# Patient Record
Sex: MALE | Race: BLACK OR AFRICAN AMERICAN | Employment: FULL TIME | URBAN - METROPOLITAN AREA
[De-identification: names, ages, dates, MRNs, and addresses within clinical notes are randomized per-mention and may not be internally consistent; named-entity substitution may affect disease eponyms.]

---

## 2023-01-30 ENCOUNTER — HOSPITAL ENCOUNTER (EMERGENCY)
Age: 38
Discharge: HOME | End: 2023-01-30
Attending: STUDENT IN AN ORGANIZED HEALTH CARE EDUCATION/TRAINING PROGRAM
Payer: MEDICAID

## 2023-01-30 VITALS
TEMPERATURE: 98.3 F | HEART RATE: 82 BPM | OXYGEN SATURATION: 97 % | DIASTOLIC BLOOD PRESSURE: 98 MMHG | SYSTOLIC BLOOD PRESSURE: 138 MMHG | RESPIRATION RATE: 16 BRPM

## 2023-01-30 DIAGNOSIS — M79.89 SOFT TISSUE MASS: Primary | ICD-10-CM

## 2023-01-30 PROCEDURE — 99283 EMERGENCY DEPT VISIT LOW MDM: CPT

## 2023-01-30 RX ORDER — CEPHALEXIN 500 MG/1
500 CAPSULE ORAL 4 TIMES DAILY
Qty: 28 CAPSULE | Refills: 0 | Status: SHIPPED | OUTPATIENT
Start: 2023-01-30 | End: 2023-02-06

## 2023-01-30 NOTE — Clinical Note
Kami Fuller was seen and treated in our emergency department on 1/30/2023. Please excuse him from work tomorrow, January 31.           Alethea Pineda

## 2023-01-31 NOTE — ED PROVIDER NOTES
[facility] EMERGENCY DEPT  EMERGENCY DEPARTMENT ENCOUNTER       Pt Name: Tao Rollins  MRN: [id number]  [place] 1985  Date of evaluation: 1/30/2023  Provider: DIPAK Lynn   PCP: Tomás Knowles NP  Note Started: 12:29 AM 1/31/23     ED attending involment: I have seen and evaluated the patient. My supervision physician was available for consultation. CHIEF COMPLAINT       Chief Complaint   Patient presents with    Cyst     Patient with cyst to base of L posterior scalp x 6 months. Has gotten bigger and more painful. HISTORY OF PRESENT ILLNESS: 1 or more elements      History From: Patient  HPI Limitations : None     Toa Rollins is a 40 y.o. male who presents area of swelling to the left posterior lower scalp for 6 months. The area has gotten slightly larger since he first noticed it. It causes pain to the area that is mild in severity and worse with palpation. He denies fever, drainage. Nursing Notes were all reviewed and agreed with or any disagreements were addressed in the HPI. REVIEW OF SYSTEMS      Review of Systems     Positives and Pertinent negatives as per HPI. PAST HISTORY     Past Medical History:  No past medical history on file. Past Surgical History:  No past surgical history on file. Family History:  No family history on file. Social History: Allergies:  Not on File    CURRENT MEDICATIONS      Discharge Medication List as of 1/30/2023  9:35 PM          PHYSICAL EXAM      ED Triage Vitals [01/30/23 1849]   ED Encounter Vitals Group      BP (!) 140/104      Pulse (Heart Rate) 86      Resp Rate 17      Temp 98.1 °F (36.7 °C)      Temp src       O2 Sat (%) 99 %      Weight       Height         Physical Exam  Vitals and nursing note reviewed. Constitutional:       General: He is not in acute distress. Skin:     Comments: There is a soft palpable mass to the left lower occipital region at the base of the scalp.  No significant erythema or warmth. No palpable cyst or lymphadenopathy. Neurological:      Mental Status: He is alert. DIAGNOSTIC RESULTS   LABS:     No results found for this or any previous visit (from the past 12 hour(s)). RADIOLOGY:  Non-plain film images such as CT, Ultrasound and MRI are read by the radiologist. Plain radiographic images are visualized and preliminarily interpreted by the ED Provider with the below findings:     N/A     Interpretation per the Radiologist below, if available at the time of this note:     No results found. PROCEDURES   Unless otherwise noted below, none  Procedures     EMERGENCY DEPARTMENT COURSE and DIFFERENTIAL DIAGNOSIS/MDM   Vitals:    Vitals:    01/30/23 1849 01/30/23 2208 01/30/23 2208   BP: (!) 140/104 (!) 138/98    Pulse: 86 82    Resp: 17 16    Temp: 98.1 °F (36.7 °C) 98.3 °F (36.8 °C)    SpO2: 99% 97% 97%        Patient was given the following medications:  Medications - No data to display    CONSULTS: (Who and What was discussed)  None    Chronic Conditions: None    Social Determinants affecting Dx or Tx: None    Records Reviewed (source and summary): None    MDM (CC/HPI Summary, DDx, ED Course, Reassessment, Disposition Considerations -Tests not done, Shared Decision Making, Pt Expectation of Test or Tx.): This is a 76-year-old male who presents with a localized area of soft tissue swelling and tenderness at the base of the left posterior scalp that is been present for 6 months and become more painful. Differential diagnosis includes lipoma, abscess, cyst, lymphadenopathy. He is afebrile and clinically well-appearing. There is a palpable soft tissue mass to the left posterior lower scalp that is approximately 4 cm in diameter. No significant erythema or warmth to the area. It does feel most consistent with a lipoma. However, will cover with antibiotic for possible overlying cellulitis given increased pain.   Will give general surgery referral for further evaluation and management. Discussed return precautions. FINAL IMPRESSION     1. Soft tissue mass          DISPOSITION/PLAN   Discharged        Care plan outlined and precautions discussed. Patient has no new complaints, changes, or physical findings. All medications were reviewed with the patient; will d/c home with antibiotic. All of pt's questions and concerns were addressed. Patient was instructed and agrees to follow up with general surgery, as well as to return to the ED upon further deterioration. Patient is ready to go home. PATIENT REFERRED TO:  Follow-up Information       Follow up With Specialties Details Why Contact Jorje Orozco MD General Surgery Call  to schedule a follow up [address] [place] [address] [address]  [phone]                DISCHARGE MEDICATIONS:  Discharge Medication List as of 1/30/2023  9:35 PM            DISCONTINUED MEDICATIONS:  Discharge Medication List as of 1/30/2023  9:35 PM          I am the Primary Clinician of Record. DIPAK Klein (electronically signed)    (Please note that parts of this dictation were completed with voice recognition software. Quite often unanticipated grammatical, syntax, homophones, and other interpretive errors are inadvertently transcribed by the computer software. Please disregards these errors.  Please excuse any errors that have escaped final proofreading.)

## 2023-02-13 ENCOUNTER — OFFICE VISIT (OUTPATIENT)
Dept: SURGERY | Age: 38
End: 2023-02-13
Payer: MEDICAID

## 2023-02-13 VITALS
WEIGHT: 199.8 LBS | TEMPERATURE: 97.5 F | OXYGEN SATURATION: 96 % | HEIGHT: 68 IN | DIASTOLIC BLOOD PRESSURE: 68 MMHG | BODY MASS INDEX: 30.28 KG/M2 | RESPIRATION RATE: 16 BRPM | HEART RATE: 86 BPM | SYSTOLIC BLOOD PRESSURE: 108 MMHG

## 2023-02-13 DIAGNOSIS — M79.89 SOFT TISSUE MASS: Primary | ICD-10-CM

## 2023-02-13 PROCEDURE — 99204 OFFICE O/P NEW MOD 45 MIN: CPT | Performed by: SURGERY

## 2023-02-13 NOTE — PROGRESS NOTES
Identified pt with two pt identifiers(name and ). Reviewed record in preparation for visit and have obtained necessary documentation. All patient medications has been reviewed. Chief Complaint   Patient presents with    Cyst     Seen at the request of Nikky Pike DO for the evaluation of cyst on the back of the head. Health Maintenance Due   Topic    Hepatitis C Screening     Depression Screen     COVID-19 Vaccine (1)    Flu Vaccine (1)       Vitals:    23 1411   BP: 108/68   Pulse: 86   Resp: 16   Temp: 97.5 °F (36.4 °C)   TempSrc: Oral   SpO2: 96%   Weight: 199 lb 12.8 oz (90.6 kg)   Height: 5' 8\" (1.727 m)   PainSc:   3   PainLoc: Neck       4. Have you been to the ER, urgent care clinic since your last visit? Hospitalized since your last visit? No    5. Have you seen or consulted any other health care providers outside of the [address] [place] [address] since your last visit? Include any pap smears or colon screening. No      Patient is accompanied by self I have received verbal consent from Kevin Hanson to discuss any/all medical information while they are present in the room.

## 2023-02-13 NOTE — H&P (VIEW-ONLY)
HISTORY OF PRESENT ILLNESS  Chadwick Todd is a 40 y.o. male. Posterior scalp mass. Present for 6 months. Getting bigger. Tried a round of antibiotics. Helped a little. No prior drainage          ____________________________________________________________________________  Patient presents with:  Cyst: Seen at the request of Poncho Ang DO for the evaluation of a mass on the back of the head. /68 (BP 1 Location: Right upper arm, BP Patient Position: Sitting)   Pulse 86   Temp 97.5 °F (36.4 °C) (Oral)   Resp 16   Ht 5' 8\" (1.727 m)   Wt 90.6 kg (199 lb 12.8 oz)   SpO2 96%   BMI 30.38 kg/m²   History reviewed. No pertinent past medical history. History reviewed. No pertinent surgical history. Social History    Socioeconomic History      Marital status: SINGLE    Tobacco Use      Smoking status: Every Day        Packs/day: 0.25        Years: 6.00        Pack years: 1.5        Types: Cigarettes      Smokeless tobacco: Never    Vaping Use      Vaping Use: Never used    Substance and Sexual Activity      Alcohol use: Not Currently      Drug use: Never    Review of patient's family history indicates:  Problem: Cancer      Relation: Maternal Grandfather          Age of Onset: (Not Specified)    No current outpatient medications on file. No current facility-administered medications for this visit. Allergies:  -- Amoxicillin -- Other (comments)    --  Unknown reaction from childhood             Unknown reaction  _____________________________________________________________________________      Mass  The history is provided by the Patient. This is a chronic problem. The current episode started more than 1 week ago. The problem occurs constantly. The problem has not changed since onset. Pertinent negatives include no chest pain, no abdominal pain, no headaches and no shortness of breath. The symptoms are aggravated by bending and twisting.      Review of Systems   Constitutional: Negative for chills, fever and weight loss. HENT:  Negative for ear pain. Eyes:  Negative for pain. Respiratory:  Negative for shortness of breath. Cardiovascular:  Negative for chest pain. Gastrointestinal:  Negative for abdominal pain and blood in stool. Genitourinary:  Negative for hematuria. Musculoskeletal:  Negative for joint pain. Skin:  Negative for rash. Neurological:  Negative for dizziness, focal weakness, seizures and headaches. Endo/Heme/Allergies:  Does not bruise/bleed easily. Psychiatric/Behavioral:  The patient does not have insomnia. Physical Exam  Constitutional:       General: He is not in acute distress. Appearance: He is well-developed. He is not diaphoretic. HENT:      Head: Normocephalic and atraumatic. Mouth/Throat:      Pharynx: No oropharyngeal exudate. Eyes:      General: No scleral icterus. Pupils: Pupils are equal, round, and reactive to light. Neck:      Trachea: No tracheal deviation. Cardiovascular:      Rate and Rhythm: Normal rate and regular rhythm. Heart sounds: Normal heart sounds. No murmur heard. Pulmonary:      Effort: Pulmonary effort is normal. No respiratory distress. Breath sounds: Normal breath sounds. No wheezing. Abdominal:      General: Bowel sounds are normal. There is no distension. Palpations: Abdomen is soft. There is no mass. Tenderness: There is no abdominal tenderness. There is no guarding or rebound. Hernia: No hernia is present. Musculoskeletal:         General: No tenderness. Normal range of motion. Cervical back: Normal range of motion and neck supple. Lymphadenopathy:      Cervical: No cervical adenopathy. Skin:     General: Skin is warm. Findings: No erythema or rash. Neurological:      Mental Status: He is alert and oriented to person, place, and time. Psychiatric:         Behavior: Behavior normal.       ASSESSMENT and PLAN    ICD-10-CM ICD-9-CM    1. Soft tissue mass  M79.89 729.99         We discussed a differential diagnosis including pilar cyst versus lipoma. As its been enlarging and causing him symptoms I think proceeding with an excision is appropriate. I had an extensive discussion with Varsha Bishop regarding the risks, benefits, and alternatives of proceeding with excision of this soft tissue mass. Risks of surgery including the risk of anesthesia, bleeding, infection, injury to underlying structures, recurrence, need for further surgery, and the lack of symptomatic improvement were discussed and he is in agreement to proceed. We will schedule in the OR at his earliest convenience. Thank you for this consult.

## 2023-02-13 NOTE — Clinical Note
2/13/2023    Patient: Lyubov Ramirez   YOB: 1985   Date of Visit: 2/13/2023     Henri Clark NP  [address] [id number]  Via Fax: [phone]     Kinjal May DO  [address] [place] [address]  [phone]  Via In Basket    Dear MAXIMINO Way DO,      Thank you [facility]. My notes for this consultation are attached. If you have questions, please do not hesitate to call me. I look forward to following your patient along with you.       Sincerely,    Charlotte Dennis MD

## 2023-02-13 NOTE — PROGRESS NOTES
HISTORY OF PRESENT ILLNESS  Aron Horvath is a 40 y.o. male. Posterior scalp mass. Present for 6 months. Getting bigger. Tried a round of antibiotics. Helped a little. No prior drainage          ____________________________________________________________________________  Patient presents with:  Cyst: Seen at the request of Rafiq Mas DO for the evaluation of a mass on the back of the head. /68 (BP 1 Location: Right upper arm, BP Patient Position: Sitting)   Pulse 86   Temp 97.5 °F (36.4 °C) (Oral)   Resp 16   Ht 5' 8\" (1.727 m)   Wt 90.6 kg (199 lb 12.8 oz)   SpO2 96%   BMI 30.38 kg/m²   History reviewed. No pertinent past medical history. History reviewed. No pertinent surgical history. Social History    Socioeconomic History      Marital status: SINGLE    Tobacco Use      Smoking status: Every Day        Packs/day: 0.25        Years: 6.00        Pack years: 1.5        Types: Cigarettes      Smokeless tobacco: Never    Vaping Use      Vaping Use: Never used    Substance and Sexual Activity      Alcohol use: Not Currently      Drug use: Never    Review of patient's family history indicates:  Problem: Cancer      Relation: Maternal Grandfather          Age of Onset: (Not Specified)    No current outpatient medications on file. No current facility-administered medications for this visit. Allergies:  -- Amoxicillin -- Other (comments)    --  Unknown reaction from childhood             Unknown reaction  _____________________________________________________________________________      Mass  The history is provided by the Patient. This is a chronic problem. The current episode started more than 1 week ago. The problem occurs constantly. The problem has not changed since onset. Pertinent negatives include no chest pain, no abdominal pain, no headaches and no shortness of breath. The symptoms are aggravated by bending and twisting.      Review of Systems   Constitutional: Negative for chills, fever and weight loss. HENT:  Negative for ear pain. Eyes:  Negative for pain. Respiratory:  Negative for shortness of breath. Cardiovascular:  Negative for chest pain. Gastrointestinal:  Negative for abdominal pain and blood in stool. Genitourinary:  Negative for hematuria. Musculoskeletal:  Negative for joint pain. Skin:  Negative for rash. Neurological:  Negative for dizziness, focal weakness, seizures and headaches. Endo/Heme/Allergies:  Does not bruise/bleed easily. Psychiatric/Behavioral:  The patient does not have insomnia. Physical Exam  Constitutional:       General: He is not in acute distress. Appearance: He is well-developed. He is not diaphoretic. HENT:      Head: Normocephalic and atraumatic. Mouth/Throat:      Pharynx: No oropharyngeal exudate. Eyes:      General: No scleral icterus. Pupils: Pupils are equal, round, and reactive to light. Neck:      Trachea: No tracheal deviation. Cardiovascular:      Rate and Rhythm: Normal rate and regular rhythm. Heart sounds: Normal heart sounds. No murmur heard. Pulmonary:      Effort: Pulmonary effort is normal. No respiratory distress. Breath sounds: Normal breath sounds. No wheezing. Abdominal:      General: Bowel sounds are normal. There is no distension. Palpations: Abdomen is soft. There is no mass. Tenderness: There is no abdominal tenderness. There is no guarding or rebound. Hernia: No hernia is present. Musculoskeletal:         General: No tenderness. Normal range of motion. Cervical back: Normal range of motion and neck supple. Lymphadenopathy:      Cervical: No cervical adenopathy. Skin:     General: Skin is warm. Findings: No erythema or rash. Neurological:      Mental Status: He is alert and oriented to person, place, and time. Psychiatric:         Behavior: Behavior normal.       ASSESSMENT and PLAN    ICD-10-CM ICD-9-CM    1. Soft tissue mass  M79.89 729.99         We discussed a differential diagnosis including pilar cyst versus lipoma. As its been enlarging and causing him symptoms I think proceeding with an excision is appropriate. I had an extensive discussion with Geo Leonard regarding the risks, benefits, and alternatives of proceeding with excision of this soft tissue mass. Risks of surgery including the risk of anesthesia, bleeding, infection, injury to underlying structures, recurrence, need for further surgery, and the lack of symptomatic improvement were discussed and he is in agreement to proceed. We will schedule in the OR at his earliest convenience. Thank you for this consult.

## 2023-02-27 NOTE — PERIOP NOTES
[facility]  Ambulatory Surgery Unit  Pre-operative Instructions    Surgery/Procedure Date  Friday, March 10, 2023            Tentative Arrival Time TBD      1. On the day of your surgery/procedure, please report to the Ambulatory Surgery Unit Registration Desk and sign in at your designated time. The Ambulatory Surgery Unit is located in [facility] on the [facility] side of the [facility] across from the [address] [place] [address]. Please have all of your health insurance cards, co-payment, and a photo ID.    **TWO adults may accompany you the day of the procedure. We have limited seating available. If our waiting room is at capacity, your ride may be asked to remain in their vehicle. No one under 15 is allowed in the waiting room. 2. You must have someone with you to drive you home, as you should not drive a car for 24 hours following anesthesia. Please make arrangements for a responsible adult friend or family member to stay with you for at least the first 24 hours after your surgery. 3. Do not have anything to eat or drink (including water, gum, mints, coffee, juice) after 11:59 PM, Thursday. This may not apply to medications prescribed by your physician. (Please note below the special instructions with medications to take the morning of surgery, if applicable.)    4. We recommend you do not drink any alcoholic beverages for 24 hours before and after your surgery. 5. Contact your surgeons office for instructions on the following medications: non-steroidal anti-inflammatory drugs (i.e. Advil, Aleve), vitamins, and supplements. (Some surgeons will want you to stop these medications prior to surgery and others may allow you to take them)   **If you are currently taking Plavix, Coumadin, Aspirin and/or other blood-thinning agents, contact your surgeon for instructions. ** Your surgeon will partner with the physician prescribing these medications to determine if it is safe to stop or if you need to continue taking. Please do not stop taking these medications without instructions from your surgeon. 6. In an effort to help prevent surgical site infection, we ask that you shower with an anti-bacterial soap (i.e. Dial/Safeguard, or the soap provided to you at your preadmission testing appointment) for 3 days prior to and on the morning of surgery, using a fresh towel after each shower. (Please begin this process with fresh bed linens.) Do not apply any lotions, powders, or deodorants after the shower on the day of your procedure. If applicable, please do not shave the operative site for 48 hours prior to surgery. 7. Wear comfortable clothes. Wear glasses instead of contacts. Do not bring any jewelry or money (other than copays or fees as instructed). Do not wear make-up, particularly mascara, the morning of your surgery. Do not wear nail polish, particularly if you are having foot /hand surgery. Wear your hair loose or down, no ponytails, buns, raquel pins or clips. All body piercings must be removed. 8. You should understand that if you do not follow these instructions your surgery may be cancelled. If your physical condition changes (i.e. fever, cold or flu) please contact your surgeon as soon as possible. 9. It is important that you be on time. If a situation occurs where you may be late, or if you have any questions or problems, please call [phone].    10. Your surgery time may be subject to change. You will receive a phone call the day prior to surgery to confirm your arrival time. 11. Pediatric patients: please bring a change of clothes, diapers, bottle/sippy cup, pacifier, etc.      Special Instructions: Take all medications and inhalers, as prescribed, on the morning of surgery with a sip of water. I understand a pre-operative phone call will be made to verify my surgery time.   In the event that I am not available, I give permission for a message to be left on my answering service and/or with another person?       yes    Preop instructions reviewed  Pt verbalized understanding.       ___________________      ___________________      ________________  (Signature of Patient)          (Witness)                   (Date and Time)

## 2023-03-08 ENCOUNTER — ANESTHESIA EVENT (OUTPATIENT)
Dept: SURGERY | Age: 38
End: 2023-03-08
Payer: MEDICAID

## 2023-03-08 RX ORDER — DROPERIDOL 2.5 MG/ML
0.62 INJECTION, SOLUTION INTRAMUSCULAR; INTRAVENOUS AS NEEDED
Status: CANCELLED | OUTPATIENT
Start: 2023-03-08

## 2023-03-08 RX ORDER — OXYCODONE AND ACETAMINOPHEN 5; 325 MG/1; MG/1
1 TABLET ORAL
Status: CANCELLED | OUTPATIENT
Start: 2023-03-08 | End: 2023-03-09

## 2023-03-08 RX ORDER — SODIUM CHLORIDE, SODIUM LACTATE, POTASSIUM CHLORIDE, CALCIUM CHLORIDE 600; 310; 30; 20 MG/100ML; MG/100ML; MG/100ML; MG/100ML
25 INJECTION, SOLUTION INTRAVENOUS CONTINUOUS
Status: CANCELLED | OUTPATIENT
Start: 2023-03-08

## 2023-03-08 RX ORDER — FENTANYL CITRATE 50 UG/ML
25 INJECTION, SOLUTION INTRAMUSCULAR; INTRAVENOUS
Status: CANCELLED | OUTPATIENT
Start: 2023-03-08

## 2023-03-08 RX ORDER — MORPHINE SULFATE 10 MG/ML
2 INJECTION, SOLUTION INTRAMUSCULAR; INTRAVENOUS
Status: CANCELLED | OUTPATIENT
Start: 2023-03-08

## 2023-03-08 RX ORDER — HYDROMORPHONE HYDROCHLORIDE 1 MG/ML
.2-.5 INJECTION, SOLUTION INTRAMUSCULAR; INTRAVENOUS; SUBCUTANEOUS ONCE
Status: CANCELLED | OUTPATIENT
Start: 2023-03-08 | End: 2023-03-08

## 2023-03-08 RX ORDER — SODIUM CHLORIDE 0.9 % (FLUSH) 0.9 %
5-40 SYRINGE (ML) INJECTION EVERY 8 HOURS
Status: CANCELLED | OUTPATIENT
Start: 2023-03-08

## 2023-03-08 RX ORDER — DIPHENHYDRAMINE HYDROCHLORIDE 50 MG/ML
12.5 INJECTION, SOLUTION INTRAMUSCULAR; INTRAVENOUS AS NEEDED
Status: CANCELLED | OUTPATIENT
Start: 2023-03-08 | End: 2023-03-08

## 2023-03-08 RX ORDER — SODIUM CHLORIDE 0.9 % (FLUSH) 0.9 %
5-40 SYRINGE (ML) INJECTION AS NEEDED
Status: CANCELLED | OUTPATIENT
Start: 2023-03-08

## 2023-03-10 ENCOUNTER — ANESTHESIA (OUTPATIENT)
Dept: SURGERY | Age: 38
End: 2023-03-10
Payer: MEDICAID

## 2023-03-10 ENCOUNTER — HOSPITAL ENCOUNTER (OUTPATIENT)
Age: 38
Setting detail: OUTPATIENT SURGERY
Discharge: HOME | End: 2023-03-10
Attending: SURGERY | Admitting: SURGERY
Payer: MEDICAID

## 2023-03-10 VITALS
HEART RATE: 71 BPM | SYSTOLIC BLOOD PRESSURE: 116 MMHG | HEIGHT: 68 IN | WEIGHT: 199.8 LBS | DIASTOLIC BLOOD PRESSURE: 75 MMHG | BODY MASS INDEX: 30.28 KG/M2 | TEMPERATURE: 97.8 F | OXYGEN SATURATION: 98 % | RESPIRATION RATE: 21 BRPM

## 2023-03-10 DIAGNOSIS — R52 PAIN: Primary | ICD-10-CM

## 2023-03-10 DIAGNOSIS — D17.0 LIPOMA OF HEAD: ICD-10-CM

## 2023-03-10 PROCEDURE — 76210000040 HC AMBSU PH I REC FIRST 0.5 HR: Performed by: SURGERY

## 2023-03-10 PROCEDURE — 74011250636 HC RX REV CODE- 250/636: Performed by: SURGERY

## 2023-03-10 PROCEDURE — 77030033138 HC SUT PGA STRATFX J&J -B: Performed by: SURGERY

## 2023-03-10 PROCEDURE — 74011250636 HC RX REV CODE- 250/636: Performed by: ANESTHESIOLOGY

## 2023-03-10 PROCEDURE — 76060000061 HC AMB SURG ANES 0.5 TO 1 HR: Performed by: SURGERY

## 2023-03-10 PROCEDURE — 74011000250 HC RX REV CODE- 250

## 2023-03-10 PROCEDURE — 88305 TISSUE EXAM BY PATHOLOGIST: CPT

## 2023-03-10 PROCEDURE — 21012 EXC FACE LES SBQ 2 CM/>: CPT | Performed by: SURGERY

## 2023-03-10 PROCEDURE — 74011250636 HC RX REV CODE- 250/636

## 2023-03-10 PROCEDURE — 77030026438 HC STYL ET INTUB CARD -A

## 2023-03-10 PROCEDURE — 77030040922 HC BLNKT HYPOTHRM STRY -A: Performed by: SURGERY

## 2023-03-10 PROCEDURE — 74011000250 HC RX REV CODE- 250: Performed by: SURGERY

## 2023-03-10 PROCEDURE — 77030003029 HC SUT VCRL J&J -B: Performed by: SURGERY

## 2023-03-10 PROCEDURE — 76030000000 HC AMB SURG OR TIME 0.5 TO 1: Performed by: SURGERY

## 2023-03-10 PROCEDURE — 77030008684 HC TU ET CUF COVD -B

## 2023-03-10 PROCEDURE — 76210000050 HC AMBSU PH II REC 0.5 TO 1 HR: Performed by: SURGERY

## 2023-03-10 PROCEDURE — 77030010507 HC ADH SKN DERMBND J&J -B: Performed by: SURGERY

## 2023-03-10 PROCEDURE — 2709999900 HC NON-CHARGEABLE SUPPLY: Performed by: SURGERY

## 2023-03-10 RX ORDER — SODIUM CHLORIDE, SODIUM LACTATE, POTASSIUM CHLORIDE, CALCIUM CHLORIDE 600; 310; 30; 20 MG/100ML; MG/100ML; MG/100ML; MG/100ML
25 INJECTION, SOLUTION INTRAVENOUS CONTINUOUS
Status: DISCONTINUED | OUTPATIENT
Start: 2023-03-10 | End: 2023-03-10 | Stop reason: HOSPADM

## 2023-03-10 RX ORDER — SODIUM CHLORIDE 0.9 % (FLUSH) 0.9 %
5-40 SYRINGE (ML) INJECTION AS NEEDED
Status: DISCONTINUED | OUTPATIENT
Start: 2023-03-10 | End: 2023-03-10 | Stop reason: HOSPADM

## 2023-03-10 RX ORDER — ONDANSETRON 2 MG/ML
INJECTION INTRAMUSCULAR; INTRAVENOUS AS NEEDED
Status: DISCONTINUED | OUTPATIENT
Start: 2023-03-10 | End: 2023-03-10 | Stop reason: HOSPADM

## 2023-03-10 RX ORDER — MIDAZOLAM HYDROCHLORIDE 1 MG/ML
INJECTION, SOLUTION INTRAMUSCULAR; INTRAVENOUS AS NEEDED
Status: DISCONTINUED | OUTPATIENT
Start: 2023-03-10 | End: 2023-03-10 | Stop reason: HOSPADM

## 2023-03-10 RX ORDER — POLYETHYLENE GLYCOL 3350 17 G/17G
17 POWDER, FOR SOLUTION ORAL DAILY
Status: SHIPPED | COMMUNITY
Start: 2023-03-10

## 2023-03-10 RX ORDER — OXYCODONE HYDROCHLORIDE 5 MG/1
5 TABLET ORAL
Qty: 10 TABLET | Refills: 0 | Status: SHIPPED | OUTPATIENT
Start: 2023-03-10 | End: 2023-03-17

## 2023-03-10 RX ORDER — IBUPROFEN 600 MG/1
600 TABLET ORAL
Qty: 25 TABLET | Refills: 1 | Status: SHIPPED | OUTPATIENT
Start: 2023-03-10 | End: 2023-03-15

## 2023-03-10 RX ORDER — LIDOCAINE HYDROCHLORIDE 10 MG/ML
0.1 INJECTION, SOLUTION EPIDURAL; INFILTRATION; INTRACAUDAL; PERINEURAL AS NEEDED
Status: DISCONTINUED | OUTPATIENT
Start: 2023-03-10 | End: 2023-03-10 | Stop reason: HOSPADM

## 2023-03-10 RX ORDER — FENTANYL CITRATE 50 UG/ML
INJECTION, SOLUTION INTRAMUSCULAR; INTRAVENOUS AS NEEDED
Status: DISCONTINUED | OUTPATIENT
Start: 2023-03-10 | End: 2023-03-10 | Stop reason: HOSPADM

## 2023-03-10 RX ORDER — BUPIVACAINE HYDROCHLORIDE AND EPINEPHRINE 5; 5 MG/ML; UG/ML
INJECTION, SOLUTION EPIDURAL; INTRACAUDAL; PERINEURAL AS NEEDED
Status: DISCONTINUED | OUTPATIENT
Start: 2023-03-10 | End: 2023-03-10 | Stop reason: HOSPADM

## 2023-03-10 RX ORDER — DEXAMETHASONE SODIUM PHOSPHATE 100 MG/10ML
INJECTION INTRAMUSCULAR; INTRAVENOUS AS NEEDED
Status: DISCONTINUED | OUTPATIENT
Start: 2023-03-10 | End: 2023-03-10 | Stop reason: HOSPADM

## 2023-03-10 RX ORDER — SUCCINYLCHOLINE CHLORIDE 20 MG/ML
INJECTION INTRAMUSCULAR; INTRAVENOUS AS NEEDED
Status: DISCONTINUED | OUTPATIENT
Start: 2023-03-10 | End: 2023-03-10 | Stop reason: HOSPADM

## 2023-03-10 RX ORDER — ONDANSETRON 4 MG/1
4 TABLET, ORALLY DISINTEGRATING ORAL
Qty: 8 TABLET | Refills: 0 | Status: SHIPPED | OUTPATIENT
Start: 2023-03-10

## 2023-03-10 RX ORDER — LIDOCAINE HYDROCHLORIDE 20 MG/ML
INJECTION, SOLUTION EPIDURAL; INFILTRATION; INTRACAUDAL; PERINEURAL AS NEEDED
Status: DISCONTINUED | OUTPATIENT
Start: 2023-03-10 | End: 2023-03-10 | Stop reason: HOSPADM

## 2023-03-10 RX ORDER — PROPOFOL 10 MG/ML
INJECTION, EMULSION INTRAVENOUS AS NEEDED
Status: DISCONTINUED | OUTPATIENT
Start: 2023-03-10 | End: 2023-03-10 | Stop reason: HOSPADM

## 2023-03-10 RX ORDER — DEXMEDETOMIDINE HYDROCHLORIDE 100 UG/ML
INJECTION, SOLUTION INTRAVENOUS AS NEEDED
Status: DISCONTINUED | OUTPATIENT
Start: 2023-03-10 | End: 2023-03-10 | Stop reason: HOSPADM

## 2023-03-10 RX ORDER — SODIUM CHLORIDE 0.9 % (FLUSH) 0.9 %
5-40 SYRINGE (ML) INJECTION EVERY 8 HOURS
Status: DISCONTINUED | OUTPATIENT
Start: 2023-03-10 | End: 2023-03-10 | Stop reason: HOSPADM

## 2023-03-10 RX ORDER — ACETAMINOPHEN 500 MG
1000 TABLET ORAL 4 TIMES DAILY
Status: SHIPPED | COMMUNITY
Start: 2023-03-10

## 2023-03-10 RX ADMIN — MIDAZOLAM HYDROCHLORIDE 2 MG: 1 INJECTION, SOLUTION INTRAMUSCULAR; INTRAVENOUS at 08:27

## 2023-03-10 RX ADMIN — DEXMEDETOMIDINE HYDROCHLORIDE 8 MCG: 100 INJECTION, SOLUTION, CONCENTRATE INTRAVENOUS at 08:27

## 2023-03-10 RX ADMIN — PROPOFOL 200 MG: 10 INJECTION, EMULSION INTRAVENOUS at 08:32

## 2023-03-10 RX ADMIN — SODIUM CHLORIDE, POTASSIUM CHLORIDE, SODIUM LACTATE AND CALCIUM CHLORIDE 25 ML/HR: 600; 310; 30; 20 INJECTION, SOLUTION INTRAVENOUS at 08:00

## 2023-03-10 RX ADMIN — PROPOFOL 50 MG: 10 INJECTION, EMULSION INTRAVENOUS at 08:35

## 2023-03-10 RX ADMIN — LIDOCAINE HYDROCHLORIDE 80 MG: 20 INJECTION, SOLUTION EPIDURAL; INFILTRATION; INTRACAUDAL; PERINEURAL at 08:27

## 2023-03-10 RX ADMIN — ONDANSETRON HYDROCHLORIDE 4 MG: 2 INJECTION, SOLUTION INTRAMUSCULAR; INTRAVENOUS at 09:05

## 2023-03-10 RX ADMIN — WATER 2 G: 1 INJECTION INTRAMUSCULAR; INTRAVENOUS; SUBCUTANEOUS at 08:40

## 2023-03-10 RX ADMIN — DEXAMETHASONE SODIUM PHOSPHATE 4 MG: 10 INJECTION INTRAMUSCULAR; INTRAVENOUS at 09:03

## 2023-03-10 RX ADMIN — SUCCINYLCHOLINE CHLORIDE 170 MG: 20 INJECTION, SOLUTION INTRAMUSCULAR; INTRAVENOUS at 08:33

## 2023-03-10 RX ADMIN — FENTANYL CITRATE 50 MCG: 50 INJECTION, SOLUTION INTRAMUSCULAR; INTRAVENOUS at 08:48

## 2023-03-10 RX ADMIN — FENTANYL CITRATE 50 MCG: 50 INJECTION, SOLUTION INTRAMUSCULAR; INTRAVENOUS at 08:32

## 2023-03-10 NOTE — ANESTHESIA POSTPROCEDURE EVALUATION
Procedure(s):  EXCISION SOFT TISSUE MASS POSTERIOR SCALP.     general    Anesthesia Post Evaluation      Multimodal analgesia: multimodal analgesia used between 6 hours prior to anesthesia start to PACU discharge  Patient location during evaluation: PACU  Patient participation: complete - patient participated  Level of consciousness: awake and alert  Pain score: 0  Airway patency: patent  Anesthetic complications: no  Cardiovascular status: acceptable  Respiratory status: acceptable  Hydration status: acceptable  Post anesthesia nausea and vomiting:  none  Final Post Anesthesia Temperature Assessment:  Normothermia (36.0-37.5 degrees C)      INITIAL Post-op Vital signs:   Vitals Value Taken Time   /65 03/10/23 0930   Temp 36.6 °C (97.8 °F) 03/10/23 0918   Pulse 78 03/10/23 0930   Resp 17 03/10/23 0930   SpO2 94 % 03/10/23 0930

## 2023-03-10 NOTE — PERIOP NOTES
Pt responsive. Denies pain or chill. Coughing clearing lungs  1000 Pt. Alert. Denies pain or chill. Discharge instructions reviewed with caregiver and patient. Allowed and answered questions. Tolerating PO fluids. Both state ready for discharge.

## 2023-03-10 NOTE — ANESTHESIA PREPROCEDURE EVALUATION
Relevant Problems   No relevant active problems       Anesthetic History   No history of anesthetic complications            Review of Systems / Medical History  Patient summary reviewed, nursing notes reviewed and pertinent labs reviewed    Pulmonary          Smoker (1/4 ppd)         Neuro/Psych   Within defined limits           Cardiovascular  Within defined limits                Exercise tolerance: >4 METS     GI/Hepatic/Renal  Within defined limits              Endo/Other        Obesity     Other Findings   Comments: Soft tissue mass posterior scalp         Physical Exam    Airway  Mallampati: I  TM Distance: 4 - 6 cm  Neck ROM: normal range of motion   Mouth opening: Normal     Cardiovascular    Rhythm: regular  Rate: normal         Dental  No notable dental hx       Pulmonary  Breath sounds clear to auscultation               Abdominal  GI exam deferred       Other Findings            Anesthetic Plan    ASA: 2  Anesthesia type: general          Induction: Intravenous  Anesthetic plan and risks discussed with: Patient

## 2023-03-10 NOTE — INTERVAL H&P NOTE
Update History & Physical    The Patient's History and Physical attached was reviewed with the patient and I examined the patient. There is no change. The surgical site was confirmed by the patient and me. Plan:  The risk, benefits, expected outcome, and alternative to the recommended procedure have been discussed with Bashir Garcia. He understands and wants to proceed with the procedure. Site marked.

## 2023-03-10 NOTE — OP NOTES
OPERATIVE NOTE  3/10/2023    Preperative Diagnosis: SOFT TISSUE MASS POSTERIOR SCALP  Post-operative Diagnosis: SOFT TISSUE MASS POSTERIOR SCALP    Procedure: Procedure(s):  EXCISION SOFT TISSUE MASS POSTERIOR SCALP 7 CM    Surgeon: Jaxon Hu MD    Circ-1: Zeyad Barillas RN  Circ-2: Patsy Mcgee RN  Scrub Tech-1: Audrey Poon,       Anesthesia: General + local    Estimated Blood Loss: Minimal  Specimens:   ID Type Source Tests Collected by Time Destination   1 : POSTERIOR NECK SOFT TISSUE MASS  Preservative Neck  Brayan White MD 3/10/2023 5293 Pathology      Findings: Fatty 7 cm x 4 cm irregularly shaped soft tissue mass consistent with a lipoma  Complications: None  Implants: None      Bruce Sales is a 40 y.o. male who has been brought to the operating room for procedure as above. The risks, benefits, and alternatives were explained and consent was obtained for the procedure. Procedure was confirmed and site marked with the patient. After general anesthesia, the patient was placed in the prone position with appropriate padding. The area was prepped and draped in the usual manner. Local anesthetic was infiltrated into the skin and soft tissue surrounding the mass. An incision was made. The mass extended through the superficial fascia, but was not involving the deep fascia or bone. The mass was fatty, consistent with a lipoma. It was dissected free of surrounding tissues and excised in its entirety and send for pathology. Hemostasis was noted. The superficial fascia was closed with interrupted 3-0 Vicryl; followed by closure of the skin with barbed 4-0 Monocryl. The wound was covered with Dermabond. He remained stable during my presence in the operating room.

## 2023-03-10 NOTE — PERIOP NOTES
Air Warming blanket placed on pt; turned on for comfort     Permission received to review discharge instructions and discuss private health information with girlfriend  and will have someone with them after discharge

## 2023-03-10 NOTE — PERIOP NOTES
Debbie Hancock  1985  [id number]    Situation:  Verbal report given from: Rn and CRNA  Procedure: Procedure(s):  EXCISION SOFT TISSUE MASS POSTERIOR SCALP    Background:    Preoperative diagnosis: SOFT TISSUE MASS POSTERIOR SCALP    Postoperative diagnosis: SOFT TISSUE MASS POSTERIOR SCALP    :  Dr. Poly Pham    Assistant(s): Circ-1: Audelia Tracy RN  Circ-2: Michelle Painting RN  Scrub Tech-1: Yaya LOZA RT    Specimens:   ID Type Source Tests Collected by Time Destination   1 : POSTERIOR NECK SOFT TISSUE MASS  Preservative Neck  Yrn Hou MD 3/10/2023 7437 Pathology       Assessment:  Intra-procedure medications         Anesthesia gave intra-procedure sedation and medications, see anesthesia flow sheet     Intravenous fluids: LR@ KVO     Vital signs stable.  CRNA had to adjust head to side tilt for patent airway      Recommendation:    Permission to share finding with Girlfriend : yes

## 2023-03-10 NOTE — DISCHARGE INSTRUCTIONS
Dr. Meme Fried Discharge Instructions for:  Bruce Sales    MRN: [id number] : 1985    Admitted: 3/10/2023  Discharged: 3/10/2023       What to do at Home    Recommended diet: Resume your normal diet    Recommended activity: as tolerated    Follow-up with Dr. Salvador Ariza in 1-2 weeks. Call [phone] for an appointment. Wound Care:  Replace outer gauze with new dry gauze daily starting today to protect the \"glue\". See additional instructions below: How to Care for Your Wound After Its Treated With DERMABOND* Topical Skin Adhesive    DERMABOND* Topical Skin Adhesive (2-octyl cyanoacrylate) is a sterile, liquid skin adhesivethat holds wound edges together. The film will usually remain in place for 5 to 10 days, thennaturally fall off your skin. The following will answer some of your questions and provide instructions for proper care for yourwound while it is healing:    CHECK WOUND APPEARANCE   Some swelling, redness, and pain are common with all wounds and normally will go away as the wound heals. If swelling, redness, or pain increases or if the wound feels warm to the touch, contact a doctor. Also contact a doctor if the wound edges reopen or separate. REPLACE BANDAGES   If your wound is bandaged, keep the bandage dry. Replace the dressing daily until the adhesive film has fallen off or if the bandage should become wet, unless otherwise instructed by your physician. When changing the dressing, do not place tape directly over the DERMABOND adhesive film, because removing the tape later may also remove the film. AVOID TOPICAL MEDICATIONS   Do not apply liquid or ointment medications or any other product to your wound while the DERMABOND adhesive film is in place. These may loosen the film before your wound is healed. KEEP WOUND DRY AND PROTECTED   You may occasionally and briefly wet your wound in the shower or bath.  Do not soak or scrub your wound, do not swim, and avoid periods of heavy perspiration until the DERMABOND adhesive has naturally fallen off. After showering or bathing, gently blot your wound dry with a soft towel. If a protective dressing is being used, apply a fresh, dry bandage, being sure to keep the tape off the DERMABOND adhesive film. Apply a clean, dry bandage over the wound if necessary to protect it. Protect your wound from injury until the skin has had sufficient time to heal.   Do not scratch, rub, or pick at the DERMABOND adhesive film. This may loosen the film before your wound is healed. Protect the wound from prolonged exposure to sunlight or tanning lamps while the film is in place. TAKE NARCOTIC PAIN MEDICATIONS WITH FOOD     Narcotics tend to be constipating, we suggest taking a stool softener such as Colace or Miralax (follow package instructions). DO NOT DRIVE WHILE TAKING NARCOTIC PAIN MEDICATIONS. DO NOT TAKE SLEEPING MEDICATIONS OR ANTIANXIETY MEDICATIONS WHILE TAKING NARCOTIC PAIN MEDICATIONS,  ESPECIALLY THE NIGHT OF ANESTHESIA! CPAP PATIENTS BE SURE TO WEAR MACHINE WHENEVER NAPPING OR SLEEPING! DISCHARGE SUMMARY from Nurse    The following personal items collected during your admission are returned to you:   Dental Appliance: Dental Appliances: None  Vision: Visual Aid: None  Hearing Aid:    Jewelry: Jewelry: None  Clothing: Clothing: With patient  Other Valuables: Other Valuables: Cell Phone (In [address] [place] [address])  Valuables sent to safe:        PATIENT INSTRUCTIONS:    After General Anesthesia or Intravenous Sedation, for 24 hours or while taking prescription Narcotics:        Someone should be with you for the next 24 hours. For your own safety, a responsible adult must drive you home. Limit your activities  Recommended activity: Rest today, up with assistance today. Do not climb stairs or shower unattended for the next 24 hours. Please start with a soft bland diet and advance as tolerated (no nausea) to regular diet.   If you have a sore throat you should try the following: fluids, warm salt water gargles, or throat lozenges. If it does not improve after several days please follow up with your primary physician. Do not drive and operate hazardous machinery  Do not make important personal or business decisions  Do  not drink alcoholic beverages  If you have not urinated within 8 hours after discharge, please contact your surgeon on call. Report the following to your surgeon:  Excessive pain, swelling, redness or odor of or around the surgical area  Temperature over 100.5  Nausea and vomiting lasting longer than 4 hours or if unable to take medications  Any signs of decreased circulation or nerve impairment to extremity: change in color, persistent  numbness, tingling, coldness or increase pain      You will receive a Post Operative Call from one of the Recovery Room Nurses on the day after your surgery to check on you. It is very important for us to know how you are recovering after your surgery. If you have an issue or need to speak with someone, please call your surgeon, do not wait for the post operative call. You may receive an e-mail or letter in the mail from [employer] regarding your experience with us in the Ambulatory Surgery Unit. Your feedback is valuable to us and we appreciate your participation in the survey. If the above instructions are not adequate or you are having problems after your surgery, call the physician at their office number. We wish you a speedy recovery ? What to do at Home:      *  Please give a list of your current medications to your Primary Care Provider. *  Please update this list whenever your medications are discontinued, doses are      changed, or new medications (including over-the-counter products) are added. *  Please carry medication information at all times in case of emergency situations.     If you have not received your influenza and/or pneumococcal vaccine, please follow up with your primary care physician. The discharge information has been reviewed with the patient and caregiver. The patient and caregiver verbalized understanding. TO PREVENT AN INFECTION      [address] YOUR HANDS    To prevent infection, good handwashing is the most important thing you or your caregiver can do. Wash your hands with soap and water or use the hand  we gave you before you touch any wounds. SHOWER    Use the antibacterial soap we gave you when you take a shower. Shower with this soap until your wounds are healed. To reach all areas of your body, you may need someone to help you. Dont forget to clean your belly button with every shower. USE CLEAN SHEETS    Use freshly cleaned sheets on your bed after surgery. To keep the surgery site clean, do not allow pets to sleep with you while your wound is still healing. STOP SMOKING    Stop smoking, or at least cut back on smoking    Smoking slows your healing.

## 2023-03-23 ENCOUNTER — OFFICE VISIT (OUTPATIENT)
Dept: SURGERY | Age: 38
End: 2023-03-23
Payer: MEDICAID

## 2023-03-23 VITALS
HEIGHT: 68 IN | TEMPERATURE: 97.5 F | RESPIRATION RATE: 16 BRPM | HEART RATE: 91 BPM | WEIGHT: 199.4 LBS | DIASTOLIC BLOOD PRESSURE: 75 MMHG | OXYGEN SATURATION: 95 % | SYSTOLIC BLOOD PRESSURE: 121 MMHG | BODY MASS INDEX: 30.22 KG/M2

## 2023-03-23 DIAGNOSIS — Z09 POSTOPERATIVE EXAMINATION: Primary | ICD-10-CM

## 2023-03-23 PROCEDURE — 99024 POSTOP FOLLOW-UP VISIT: CPT | Performed by: SURGERY

## 2023-03-23 NOTE — PROGRESS NOTES
Chief Complaint   Patient presents with    Surgical Follow-up     S/p EXCISION SOFT TISSUE MASS POSTERIOR SCALP 7 CM on 3/10/23     Reviewed pathology and provided a copy: Lipoma    Taking no pain meds      Physical Exam:   Wound: clean, dry, no drainage    Doing well  Reviewed incision care  Happy with the results  Continue unrestricted activity.   Follow-up: cesar Swann MD FACS

## 2023-03-23 NOTE — Clinical Note
3/23/2023    Patient: Jerad Grace   YOB: 1985   Date of Visit: 3/23/2023     Corie Salgado NP  [address] [address]  Via Fax: [phone]     Roebrto Feliciano DO  [address] [place] [address]  [address] [phone]  Via In Basket    Dear MAXIMINO Paul DO,      Thank you for referring Mr. Jerad Grace to  [facility] for evaluation. My notes for this consultation are attached. If you have questions, please do not hesitate to call me. I look forward to following your patient along with you.       Sincerely,    Dennie Listen, MD

## 2023-03-23 NOTE — PROGRESS NOTES
Identified pt with two pt identifiers(name and ). Reviewed record in preparation for visit and have obtained necessary documentation. All patient medications has been reviewed. Chief Complaint   Patient presents with    Surgical Follow-up     S/p EXCISION SOFT TISSUE MASS POSTERIOR SCALP 7 CM on 3/10/23       Health Maintenance Due   Topic    Hepatitis C Screening     COVID-19 Vaccine (1)    Varicella Vaccine (1 of 2 - 2-dose childhood series)    Pneumococcal 0-64 years (1 - PCV)    DTaP/Tdap/Td series (1 - Tdap)    Flu Vaccine (1)       Vitals:    23 1306   BP: 121/75   Pulse: 91   Resp: 16   Temp: 97.5 °F (36.4 °C)   TempSrc: Temporal   SpO2: 95%   Weight: 90.4 kg (199 lb 6.4 oz)   Height: 5' 8\" (1.727 m)   PainSc:   0 - No pain       4. Have you been to the ER, urgent care clinic since your last visit? Hospitalized since your last visit? No    5. Have you seen or consulted any other health care providers outside of the [address] [place] [address] since your last visit? Include any pap smears or colon screening. No      Patient is accompanied by self I have received verbal consent from Joe Linda to discuss any/all medical information while they are present in the room.

## (undated) DEVICE — SUTURE STRATAFIX SPRL MCRYL + SZ 4-0 L12IN ABSRB UD PS-2 SXMP1B117

## (undated) DEVICE — BLANKET WRM AD PREM MISTRAL-AIR

## (undated) DEVICE — 4-PORT MANIFOLD: Brand: NEPTUNE 2

## (undated) DEVICE — MAJOR LAPAROTOMY-MRMC: Brand: MEDLINE INDUSTRIES, INC.

## (undated) DEVICE — KENDALL DL 5 LEADS DUAL CONNECT SYSTEM BLENDED CASE - SINGLE-PATIENT-USE: Brand: SUSTAINABLE TECHNOLOGIES

## (undated) DEVICE — BAG ICE W6.5XL14IN REFILLABLE RECTANG 4 TIE ATTCH W/ CLMP

## (undated) DEVICE — CATHETER ETER IV 20GA L125IN POLYUR STR RADPQ INTROCAN SFTY

## (undated) DEVICE — SUTURE ABSORBABLE BRAIDED 3-0 SHB 18 IN UD VICRYL + VCPB864D

## (undated) DEVICE — HYPODERMIC SAFETY NEEDLE: Brand: MAGELLAN

## (undated) DEVICE — SOLUTION IRRIG 1000ML 0.9% SOD CHL USP POUR PLAS BTL

## (undated) DEVICE — GLOVE SURG SZ 7.5 L11.2IN THK9.8MIL STRW LTX POLYMER BEAD

## (undated) DEVICE — ADHESIVE SKIN CLSR 0.7ML TOP DERMBND ADV

## (undated) DEVICE — INTENDED FOR TISSUE SEPARATION, AND OTHER PROCEDURES THAT REQUIRE A SHARP SURGICAL BLADE TO PUNCTURE OR CUT.: Brand: BARD-PARKER ® CARBON RIB-BACK BLADES

## (undated) DEVICE — REM POLYHESIVE ADULT PATIENT RETURN ELECTRODE: Brand: VALLEYLAB

## (undated) DEVICE — SOLUTION IV 1000ML LAC RINGERS PH 6.5 INJ USP VIAFLX PLAS